# Patient Record
Sex: MALE | Race: BLACK OR AFRICAN AMERICAN | URBAN - METROPOLITAN AREA
[De-identification: names, ages, dates, MRNs, and addresses within clinical notes are randomized per-mention and may not be internally consistent; named-entity substitution may affect disease eponyms.]

---

## 2021-08-18 ENCOUNTER — OFFICE VISIT (OUTPATIENT)
Dept: URBAN - METROPOLITAN AREA CLINIC 30 | Facility: CLINIC | Age: 41
End: 2021-08-18
Payer: COMMERCIAL

## 2021-08-18 DIAGNOSIS — H25.13 AGE-RELATED NUCLEAR CATARACT, BILATERAL: ICD-10-CM

## 2021-08-18 PROCEDURE — 92250 FUNDUS PHOTOGRAPHY W/I&R: CPT | Performed by: OPHTHALMOLOGY

## 2021-08-18 PROCEDURE — 99204 OFFICE O/P NEW MOD 45 MIN: CPT | Performed by: OPHTHALMOLOGY

## 2021-08-18 PROCEDURE — 76514 ECHO EXAM OF EYE THICKNESS: CPT | Performed by: OPHTHALMOLOGY

## 2021-08-18 PROCEDURE — 92020 GONIOSCOPY: CPT | Performed by: OPHTHALMOLOGY

## 2021-08-18 PROCEDURE — 92133 CPTRZD OPH DX IMG PST SGM ON: CPT | Performed by: OPHTHALMOLOGY

## 2021-08-18 RX ORDER — LATANOPROST 50 UG/ML
0.005 % SOLUTION OPHTHALMIC
Qty: 7.5 | Refills: 3 | Status: ACTIVE
Start: 2021-08-18

## 2021-08-18 ASSESSMENT — INTRAOCULAR PRESSURE
OS: 43
OD: 35

## 2021-08-18 NOTE — IMPRESSION/PLAN
Impression: Primary open-angle glaucoma, mild stage, bilateral: H40.1131. Plan: Pt has Glaucoma    Gonio: open to CBB OU  Pachs: 542/553   Today's IOP: 35/43       Tmax: 36/ 43 Target IOP low to mid teens Pt denies Fhx of Glaucoma Right is the better seeing eye No current VF
C/D:  .6/ .65 OD ; .7/ .75 OS
OCT: 78/20 poor scan Pt denies Sulfa Allergy   // Heart dx POSITVE asthma Plan :
1. Start Latanoprost QHS OU Discussed with patient due to findings on OCT, and posterior examination, treatment is recommended for Glaucoma. IOP is too high for the level of glaucomatous damage at the present time. Recommend lowering IOP. Patient to begin Using Latanoprost QHS OU, ERx'd as requested. Emphasized and explained compliance. Poor compliance can lead to blindness. Call with any changes in vision, sudden onset of pain or new symptoms. 
RTC in 1 month for IOP check and VF (24-2)

## 2021-09-17 ENCOUNTER — OFFICE VISIT (OUTPATIENT)
Dept: URBAN - METROPOLITAN AREA CLINIC 10 | Facility: CLINIC | Age: 41
End: 2021-09-17
Payer: COMMERCIAL

## 2021-09-17 DIAGNOSIS — H40.1131 PRIMARY OPEN-ANGLE GLAUCOMA, MILD STAGE, BILATERAL: Primary | ICD-10-CM

## 2021-09-17 PROCEDURE — 99213 OFFICE O/P EST LOW 20 MIN: CPT | Performed by: OPHTHALMOLOGY

## 2021-09-17 PROCEDURE — 92083 EXTENDED VISUAL FIELD XM: CPT | Performed by: OPHTHALMOLOGY

## 2021-09-17 RX ORDER — DORZOLAMIDE HCL 20 MG/ML
2 % SOLUTION/ DROPS OPHTHALMIC
Qty: 15 | Refills: 3 | Status: ACTIVE
Start: 2021-09-17

## 2021-09-17 ASSESSMENT — INTRAOCULAR PRESSURE
OS: 31
OD: 23

## 2021-09-17 NOTE — IMPRESSION/PLAN
Impression: Primary open-angle glaucoma, mild stage, bilateral: H40.1131. Plan: Pt has Glaucoma    Gonio: open to CBB OU  Pachs: 542/553   Today's IOP: 23//31     Tmax: 36/ 43 Target IOP low to mid teens Pt denies Fhx of Glaucoma Right is the better seeing eye VFT 9/17/21 inferior defects OU
C/D:  .6/ .65 OD ; .7/ .75 OS
OCT: 78/20 poor scan Pt denies Sulfa Allergy   // Heart dx POSITVE asthma Plan :
1. IOP is lower, but still may be too high. Will add an additional drop to attempt to lower IOP. 2. Continue Latanoprost QHS OU 3. START Dorzolamide BID OU 4.  RTC in 2 months for IOP check

## 2024-09-16 ENCOUNTER — OFFICE VISIT (OUTPATIENT)
Facility: LOCATION | Age: 44
End: 2024-09-16
Payer: COMMERCIAL

## 2024-09-16 DIAGNOSIS — H40.1131 PRIMARY OPEN-ANGLE GLAUCOMA, BILATERAL, MILD STAGE: Primary | ICD-10-CM

## 2024-09-16 DIAGNOSIS — H52.13 MYOPIA, BILATERAL: ICD-10-CM

## 2024-09-16 PROCEDURE — 92133 CPTRZD OPH DX IMG PST SGM ON: CPT | Performed by: OPTOMETRIST

## 2024-09-16 PROCEDURE — 76514 ECHO EXAM OF EYE THICKNESS: CPT | Performed by: OPTOMETRIST

## 2024-09-16 PROCEDURE — 99204 OFFICE O/P NEW MOD 45 MIN: CPT | Performed by: OPTOMETRIST

## 2024-09-16 RX ORDER — DORZOLAMIDE HCL 20 MG/ML
2 % SOLUTION/ DROPS OPHTHALMIC
Qty: 15 | Refills: 1 | Status: ACTIVE
Start: 2024-09-16

## 2024-09-16 RX ORDER — LATANOPROST 50 UG/ML
0.005 % SOLUTION OPHTHALMIC
Qty: 7.5 | Refills: 1 | Status: ACTIVE
Start: 2024-09-16

## 2024-09-16 ASSESSMENT — KERATOMETRY
OS: 42.75
OD: 42.63

## 2024-09-16 ASSESSMENT — VISUAL ACUITY
OD: 20/20
OS: 20/25

## 2024-09-16 ASSESSMENT — INTRAOCULAR PRESSURE
OS: 34
OD: 38